# Patient Record
Sex: MALE | Race: WHITE | Employment: FULL TIME | URBAN - METROPOLITAN AREA
[De-identification: names, ages, dates, MRNs, and addresses within clinical notes are randomized per-mention and may not be internally consistent; named-entity substitution may affect disease eponyms.]

---

## 2022-03-18 PROBLEM — E66.01 SEVERE OBESITY (HCC): Status: ACTIVE | Noted: 2019-08-01

## 2022-03-19 PROBLEM — G47.10 HYPERSOMNIA: Status: ACTIVE | Noted: 2019-08-01

## 2022-03-19 PROBLEM — G47.26 SHIFT WORK SLEEP DISORDER: Status: ACTIVE | Noted: 2019-08-01

## 2022-09-09 NOTE — PROGRESS NOTES
[address] [place] [address] Court, [address] [facility]  [phone]    Patient Name:  Irene Danielle  YOB: 1975      Office Visit 9/12/2022    Chief Complaint   Patient presents with    Follow-up    Sleep Apnea    CPAP/BiPAP       HISTORY OF PRESENT ILLNESS:    This pleasant gentleman came in today for a follow-up visit after last being seen in 2020. Patient currently is on CPAP at 10 cm H2O with C-Flex 3 and has an old S9 machine. His original polysomnogram was in 2001 showed his AHI was 52.8 lowest sat was 76%    Patient's compliance data shows he is use it 90 out of 90 days. All 90 days or more than 4 hours. They are for an average of 7 minutes shy of 8 hours/day. He is currently using nasal pillows. He currently reports he has no problems with the airway pressure, his mask or humidity. Patient has been working on weight loss. He currently reports that when he for started Pap therapy in 2011 he was 230 pounds, but over the years he did go up and even this June he was up to 290 pounds. He indicated he has been running with a friend and he is currently down to 276 pounds. He reports he feels better. He still reports he can improve his diet slightly. 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 DIAGNOSTIC TESTS:    No flowsheet data found. Past Medical History:   Diagnosis Date    Asthma     HTN (hypertension)     Obesity, unspecified     HEBERT (obstructive sleep apnea)          Patient Active Problem List   Diagnosis    Obesity (BMI 30-39. 9)    HTN (hypertension)    Shift work sleep disorder    Hypersomnia    HEBERT (obstructive sleep apnea)    Sinus congestion           History reviewed. No pertinent surgical history.       Social History     Socioeconomic History    Marital status:      Spouse name: Not on file    Number of children: Not on file    Years of education: Not on file    Highest education level: Not on file Occupational History    Not on file   Tobacco Use    Smoking status: Never    Smokeless tobacco: Never   Substance and Sexual Activity    Alcohol use: No    Drug use: Never    Sexual activity: Not on file   Other Topics Concern    Not on file   Social History Narrative    Not on file     Social Determinants of Health     Financial Resource Strain: Not on file   Food Insecurity: Not on file   Transportation Needs: Not on file   Physical Activity: Not on file   Stress: Not on file   Social Connections: Not on file   Intimate Partner Violence: Not on file   Housing Stability: Not on file         Family History   Problem Relation Age of Onset    Heart Disease Other         Grandparents    Sleep Apnea Other         Aunt    Hypertension Other         Grandparents    Hypertension Mother          Allergies   Allergen Reactions    Penicillins Other (See Comments)         Current Outpatient Medications   Medication Sig    cetirizine (ZYRTEC) 10 MG tablet Take 10 mg by mouth daily    FARXIGA 5 MG tablet     fenofibrate (TRIGLIDE) 160 MG tablet     metFORMIN (GLUCOPHAGE-XR) 500 MG extended release tablet     FENOFIBRATE PO Take by mouth    fluticasone (FLONASE) 50 MCG/ACT nasal spray Nightly. lisinopril (PRINIVIL;ZESTRIL) 10 MG tablet Take by mouth daily     No current facility-administered medications for this visit. REVIEW OF SYSTEMS:     CONSTITUTIONAL:   There is Negative history of fever, chills, night sweats. Patient is  Positivefor weight loss, they are  Negative for  weight gain. Patient is  Negative  for fatigue and hypersomnia and is  on their CPAP. Insomnia is   under control. CARDIAC:   No chest pain, pressure, discomfort, palpitations, orthopnea, murmurs, or edema. GI:   No dysphagia, heartburn reflux, nausea/vomiting, diarrhea, abdominal pain, or bleeding.      NEURO:    There is no history of AMS, persistent headache, decreased level of consciousness, seizures, or motor or sensory deficits. PHYSICAL EXAM:    /78 (Site: Left Upper Arm, Position: Sitting)   Pulse 92   Temp 97 °F (36.1 °C) (Skin)   Resp 14   Ht 6' (1.829 m)   Wt 276 lb (125.2 kg)   SpO2 96%   BMI 37.43 kg/m²     Body mass index is 37.43 kg/m². Constitutional:  the patient is well developed and in no acute distress  EENMT:  Sclera clear, pupils equal, oral mucosa moist, narrow oral airway Modified [place] Stage 4, Nares are noted congested in the right nasal passage versus the left. There is decreased air entry in the right nasal passage. Respiratory: CTA b/l. No Wheezing or Rhonchi. Cardiovascular:  RRR without M,G,R  Gastrointestinal: soft and non-tender; with positive bowel sounds. Musculoskeletal: warm without cyanosis. There is No lower leg edema. Skin:  no jaundice or rashes, no visible wounds   Neurologic: no gross neuro deficits     Psychiatric:  alert and oriented x 3        ASSESSMENT/PLAN:  (Medical Decision Making)       ICD-10-CM    1. HEBERT (obstructive sleep apnea)  G47.33 -Patient has severe sleep apnea but well controlled with Pap therapy. We will write for him to get a new machine and S 11 and will use an AutoPap mode, since his weight has fluctuated over the past few years. He is in agreement with this plan. - He is compliant and benefiting from PAP therapy      2. Obesity (BMI 30-39. 9)  E66.9 -Patient has been losing weight by running at this time. Down 14 pounds since June of this year. Congratulated on his efforts. Also recommended diet control with decrease carbohydrate intake. Increase fruits and vegetables, lean meats, etc.      3. Sinus congestion  R09.81 -Does have sinus congestion and would likely benefit from nasal saline to his right nasal passage. If in the future that does not work, can use Flonase around dinnertime. Please note he does use nasal pillows      4.  Shift work sleep disorder  G47.26 -Currently at this time indicates he is no longer doing shift work.  He does have call every 6 weeks and may have to go in, but not as often as previously was      5. Hypersomnia  G47.10 -Improved with therapy             SUMMARY:    -Continue Pap therapy but now we will order him an S 11 with a auto mode with a pressure of 7-12 cm H2O with C-Flex of 3, humidification and an auto ramp time    Supplies Renewed. Patient is compliant and benefit benefiting from CPAP. Congratulated on weight loss advised to continue. See above regarding dietary recommendations     Continue proper sleep hygiene. Did review [place] score, and compliance data today. All questions are answered to the patient's satisfaction. The patient will call for further questions and concerns. Follow up at the [address] [place] [address] will be in 1 year. Follow up will be with the patient's referring physician as had been previously scheduled. Gabbi Estevez MD    Over 50% of today's office visit was spent in face to face time reviewing test results, prognosis, importance of compliance, education about disease process, benefits of medications, instructions for management of acute flare-ups, and follow up plans. Electronically signed and dictated. Please note if there are errors this is  likely a result of the dictation software. No orders of the defined types were placed in this encounter. No orders of the defined types were placed in this encounter.

## 2022-09-12 ENCOUNTER — OFFICE VISIT (OUTPATIENT)
Dept: SLEEP MEDICINE | Age: 47
End: 2022-09-12
Payer: COMMERCIAL

## 2022-09-12 VITALS
TEMPERATURE: 97 F | HEIGHT: 72 IN | HEART RATE: 92 BPM | RESPIRATION RATE: 14 BRPM | OXYGEN SATURATION: 96 % | DIASTOLIC BLOOD PRESSURE: 78 MMHG | BODY MASS INDEX: 37.38 KG/M2 | WEIGHT: 276 LBS | SYSTOLIC BLOOD PRESSURE: 112 MMHG

## 2022-09-12 DIAGNOSIS — E66.9 OBESITY (BMI 30-39.9): ICD-10-CM

## 2022-09-12 DIAGNOSIS — G47.33 OSA (OBSTRUCTIVE SLEEP APNEA): Primary | ICD-10-CM

## 2022-09-12 DIAGNOSIS — G47.10 HYPERSOMNIA: ICD-10-CM

## 2022-09-12 DIAGNOSIS — G47.26 SHIFT WORK SLEEP DISORDER: ICD-10-CM

## 2022-09-12 DIAGNOSIS — R09.81 SINUS CONGESTION: ICD-10-CM

## 2022-09-12 PROBLEM — E66.01 SEVERE OBESITY (HCC): Status: RESOLVED | Noted: 2019-08-01 | Resolved: 2022-09-12

## 2022-09-12 PROCEDURE — 99214 OFFICE O/P EST MOD 30 MIN: CPT | Performed by: INTERNAL MEDICINE

## 2022-09-12 RX ORDER — FENOFIBRATE 160 MG/1
TABLET ORAL
COMMUNITY
Start: 2022-09-08

## 2022-09-12 RX ORDER — DAPAGLIFLOZIN 5 MG/1
TABLET, FILM COATED ORAL
COMMUNITY
Start: 2022-09-08

## 2022-09-12 RX ORDER — METFORMIN HYDROCHLORIDE 500 MG/1
TABLET, EXTENDED RELEASE ORAL
COMMUNITY
Start: 2022-09-08

## 2022-09-12 RX ORDER — CETIRIZINE HYDROCHLORIDE 10 MG/1
10 TABLET ORAL DAILY
COMMUNITY

## 2022-09-12 ASSESSMENT — SLEEP AND FATIGUE QUESTIONNAIRES
HOW LIKELY ARE YOU TO NOD OFF OR FALL ASLEEP IN A CAR, WHILE STOPPED FOR A FEW MINUTES IN TRAFFIC: 0
HOW LIKELY ARE YOU TO NOD OFF OR FALL ASLEEP WHILE SITTING AND READING: 1
ESS TOTAL SCORE: 7
HOW LIKELY ARE YOU TO NOD OFF OR FALL ASLEEP WHILE SITTING AND TALKING TO SOMEONE: 0
HOW LIKELY ARE YOU TO NOD OFF OR FALL ASLEEP WHILE LYING DOWN TO REST IN THE AFTERNOON WHEN CIRCUMSTANCES PERMIT: 3
HOW LIKELY ARE YOU TO NOD OFF OR FALL ASLEEP WHILE SITTING INACTIVE IN A PUBLIC PLACE: 0
HOW LIKELY ARE YOU TO NOD OFF OR FALL ASLEEP WHILE WATCHING TV: 1
HOW LIKELY ARE YOU TO NOD OFF OR FALL ASLEEP WHEN YOU ARE A PASSENGER IN A CAR FOR AN HOUR WITHOUT A BREAK: 1
HOW LIKELY ARE YOU TO NOD OFF OR FALL ASLEEP WHILE SITTING QUIETLY AFTER LUNCH WITHOUT ALCOHOL: 1

## 2023-09-11 NOTE — PROGRESS NOTES
)-Non-Disposable Filter (1 per 6 mos)  (  x   )-Water Chamber (1 per 6 mos)  (     )-Humidifier non-heated (1 per 5 yrs)      Signed Date: 9/13/2023  Electronically Signed By: MUNIR Coleman CNP  Electronically Dated:  9/13/2023       Collaborating Physician: Dr. Marina Gutierrez office visit was spent  reviewing test results, prognosis, importance of compliance, education about disease process, benefits of medications, instructions for management of acute flare-ups, and follow up plans. Total time spent with patient was 20 minutes.         MUNIR Coleman CNP  Electronically signed

## 2023-09-13 ENCOUNTER — OFFICE VISIT (OUTPATIENT)
Dept: SLEEP MEDICINE | Age: 48
End: 2023-09-13
Payer: COMMERCIAL

## 2023-09-13 VITALS
SYSTOLIC BLOOD PRESSURE: 122 MMHG | OXYGEN SATURATION: 95 % | DIASTOLIC BLOOD PRESSURE: 80 MMHG | WEIGHT: 290 LBS | HEIGHT: 72 IN | HEART RATE: 86 BPM | RESPIRATION RATE: 14 BRPM | BODY MASS INDEX: 39.28 KG/M2

## 2023-09-13 DIAGNOSIS — G47.33 OSA (OBSTRUCTIVE SLEEP APNEA): Primary | ICD-10-CM

## 2023-09-13 DIAGNOSIS — E66.9 OBESITY (BMI 30-39.9): ICD-10-CM

## 2023-09-13 PROCEDURE — 99213 OFFICE O/P EST LOW 20 MIN: CPT | Performed by: STUDENT IN AN ORGANIZED HEALTH CARE EDUCATION/TRAINING PROGRAM

## 2023-09-13 PROCEDURE — 3079F DIAST BP 80-89 MM HG: CPT | Performed by: STUDENT IN AN ORGANIZED HEALTH CARE EDUCATION/TRAINING PROGRAM

## 2023-09-13 PROCEDURE — 3074F SYST BP LT 130 MM HG: CPT | Performed by: STUDENT IN AN ORGANIZED HEALTH CARE EDUCATION/TRAINING PROGRAM

## 2023-09-13 RX ORDER — ALLOPURINOL 300 MG/1
300 TABLET ORAL DAILY
COMMUNITY
Start: 2023-08-17

## 2023-09-13 RX ORDER — AMILORIDE HYDROCHLORIDE AND HYDROCHLOROTHIAZIDE 5; 50 MG/1; MG/1
1 TABLET ORAL DAILY
COMMUNITY
Start: 2023-08-17

## 2023-09-13 ASSESSMENT — SLEEP AND FATIGUE QUESTIONNAIRES
HOW LIKELY ARE YOU TO NOD OFF OR FALL ASLEEP WHILE SITTING AND TALKING TO SOMEONE: 1
HOW LIKELY ARE YOU TO NOD OFF OR FALL ASLEEP WHILE LYING DOWN TO REST IN THE AFTERNOON WHEN CIRCUMSTANCES PERMIT: 2
HOW LIKELY ARE YOU TO NOD OFF OR FALL ASLEEP WHILE SITTING AND READING: 2
HOW LIKELY ARE YOU TO NOD OFF OR FALL ASLEEP WHILE SITTING INACTIVE IN A PUBLIC PLACE: 2
ESS TOTAL SCORE: 12
HOW LIKELY ARE YOU TO NOD OFF OR FALL ASLEEP WHEN YOU ARE A PASSENGER IN A CAR FOR AN HOUR WITHOUT A BREAK: 1
HOW LIKELY ARE YOU TO NOD OFF OR FALL ASLEEP WHILE SITTING QUIETLY AFTER LUNCH WITHOUT ALCOHOL: 2
HOW LIKELY ARE YOU TO NOD OFF OR FALL ASLEEP IN A CAR, WHILE STOPPED FOR A FEW MINUTES IN TRAFFIC: 0
HOW LIKELY ARE YOU TO NOD OFF OR FALL ASLEEP WHILE WATCHING TV: 2

## 2024-10-01 NOTE — PROGRESS NOTES
patient's referring physician as had been previously scheduled.    Jeremy Delatorre MD    Over 50% of today's office visit was spent in face to face time reviewing test results, prognosis, importance of compliance, education about disease process, benefits of medications, instructions for management of acute flare-ups, and follow up plans.       Electronically signed and dictated.  Please note if there are errors this is  likely a result of the dictation software.    No orders of the defined types were placed in this encounter.     No orders of the defined types were placed in this encounter.

## 2024-10-02 ENCOUNTER — OFFICE VISIT (OUTPATIENT)
Dept: SLEEP MEDICINE | Age: 49
End: 2024-10-02
Payer: COMMERCIAL

## 2024-10-02 VITALS
WEIGHT: 290 LBS | BODY MASS INDEX: 39.28 KG/M2 | HEART RATE: 64 BPM | OXYGEN SATURATION: 97 % | RESPIRATION RATE: 14 BRPM | HEIGHT: 72 IN | DIASTOLIC BLOOD PRESSURE: 74 MMHG | SYSTOLIC BLOOD PRESSURE: 112 MMHG

## 2024-10-02 DIAGNOSIS — G47.26 SHIFT WORK SLEEP DISORDER: ICD-10-CM

## 2024-10-02 DIAGNOSIS — G47.33 OSA (OBSTRUCTIVE SLEEP APNEA): Primary | ICD-10-CM

## 2024-10-02 DIAGNOSIS — E66.9 OBESITY (BMI 30-39.9): ICD-10-CM

## 2024-10-02 PROCEDURE — 99214 OFFICE O/P EST MOD 30 MIN: CPT | Performed by: INTERNAL MEDICINE

## 2024-10-02 PROCEDURE — G2211 COMPLEX E/M VISIT ADD ON: HCPCS | Performed by: INTERNAL MEDICINE

## 2024-10-02 PROCEDURE — 3074F SYST BP LT 130 MM HG: CPT | Performed by: INTERNAL MEDICINE

## 2024-10-02 PROCEDURE — 3078F DIAST BP <80 MM HG: CPT | Performed by: INTERNAL MEDICINE

## 2024-10-02 ASSESSMENT — SLEEP AND FATIGUE QUESTIONNAIRES
HOW LIKELY ARE YOU TO NOD OFF OR FALL ASLEEP WHILE LYING DOWN TO REST IN THE AFTERNOON WHEN CIRCUMSTANCES PERMIT: HIGH CHANCE OF DOZING
HOW LIKELY ARE YOU TO NOD OFF OR FALL ASLEEP IN A CAR, WHILE STOPPED FOR A FEW MINUTES IN TRAFFIC: WOULD NEVER DOZE
ESS TOTAL SCORE: 6
HOW LIKELY ARE YOU TO NOD OFF OR FALL ASLEEP WHEN YOU ARE A PASSENGER IN A CAR FOR AN HOUR WITHOUT A BREAK: SLIGHT CHANCE OF DOZING
HOW LIKELY ARE YOU TO NOD OFF OR FALL ASLEEP WHILE SITTING AND READING: WOULD NEVER DOZE
HOW LIKELY ARE YOU TO NOD OFF OR FALL ASLEEP WHILE SITTING AND TALKING TO SOMEONE: SLIGHT CHANCE OF DOZING
HOW LIKELY ARE YOU TO NOD OFF OR FALL ASLEEP WHILE SITTING QUIETLY AFTER LUNCH WITHOUT ALCOHOL: WOULD NEVER DOZE
HOW LIKELY ARE YOU TO NOD OFF OR FALL ASLEEP WHILE SITTING INACTIVE IN A PUBLIC PLACE: WOULD NEVER DOZE
HOW LIKELY ARE YOU TO NOD OFF OR FALL ASLEEP WHILE WATCHING TV: SLIGHT CHANCE OF DOZING

## 2025-04-22 NOTE — PROGRESS NOTES
[facility]  [address]  [place] [address]  [phone]    Patient Name:  Bal Phillips  YOB: 1975      Office Visit 4/23/2025    Chief Complaint   Patient presents with    Follow-up    Sleep Apnea    CPAP/BiPAP       HISTORY OF PRESENT ILLNESS:    This pleasant gentleman came in today for a follow-up visit regarding his sleep apnea.  .  To recap:  Patient has a diagnosis of sleep apnea patient's sleep study in 2001 revealed AHI of 52.8 with lowest desaturation 76%. He is prescribed cpap therapy with a humidifier set at 7-12cm with C-Flex of -2 with a nasal mask.  He has been compliant in the past.    Patient has been trying to work on weight loss.    At this time:  Patient is currently doing very well with PAP therapy.  Compliance data shows he is using 90 out of 90 days and 89 days of more than 4 hours average sleep is 7 hours and 28 minutes.  His 95th percentile pressure is 9.2 cm H2O.  His 95th percentile air leak is 1.1 L/min.  And his AHI is down to 0.4.  He indicates he feels good with his PAP therapy he does like the new mask and feels it is working for him.-The Neo 2.0 nasal pillow mask.    Denies any problems with the airway pressure, his mask or humidity.    He currently is on a new medication which he reports has helped him curb his appetite it is Rybelsus.  Please note he has lost 11 pounds since October of this year.  He feels better.    [place] score is 6/24         4/23/2025    10:09 AM 10/2/2024     3:05 PM 9/13/2023     3:01 PM 9/12/2022     8:27 AM   Sleep Medicine   Sitting and reading 0 0 2 1   Watching TV 1 1 2 1   Sitting, inactive in a public place (e.g. a theatre or a meeting) 0 0 2 0   As a passenger in a car for an hour without a break 2 1 1 1   Lying down to rest in the afternoon when circumstances permit 3 3 2 3   Sitting and talking to someone 0 1 1 0   Sitting quietly after a lunch without alcohol 0 0 2 1   In a car, while stopped for

## 2025-04-23 ENCOUNTER — OFFICE VISIT (OUTPATIENT)
Dept: SLEEP MEDICINE | Age: 50
End: 2025-04-23
Payer: COMMERCIAL

## 2025-04-23 VITALS
HEIGHT: 72 IN | SYSTOLIC BLOOD PRESSURE: 144 MMHG | WEIGHT: 279 LBS | BODY MASS INDEX: 37.79 KG/M2 | RESPIRATION RATE: 14 BRPM | DIASTOLIC BLOOD PRESSURE: 96 MMHG | HEART RATE: 89 BPM | OXYGEN SATURATION: 90 %

## 2025-04-23 DIAGNOSIS — E66.9 OBESITY (BMI 30-39.9): ICD-10-CM

## 2025-04-23 DIAGNOSIS — G47.33 OSA (OBSTRUCTIVE SLEEP APNEA): Primary | ICD-10-CM

## 2025-04-23 PROCEDURE — 3077F SYST BP >= 140 MM HG: CPT | Performed by: INTERNAL MEDICINE

## 2025-04-23 PROCEDURE — G2211 COMPLEX E/M VISIT ADD ON: HCPCS | Performed by: INTERNAL MEDICINE

## 2025-04-23 PROCEDURE — 99214 OFFICE O/P EST MOD 30 MIN: CPT | Performed by: INTERNAL MEDICINE

## 2025-04-23 PROCEDURE — 3080F DIAST BP >= 90 MM HG: CPT | Performed by: INTERNAL MEDICINE

## 2025-04-23 RX ORDER — ORAL SEMAGLUTIDE 7 MG/1
TABLET ORAL
COMMUNITY

## 2025-04-23 ASSESSMENT — SLEEP AND FATIGUE QUESTIONNAIRES
HOW LIKELY ARE YOU TO NOD OFF OR FALL ASLEEP WHILE SITTING AND READING: WOULD NEVER DOZE
ESS TOTAL SCORE: 6
HOW LIKELY ARE YOU TO NOD OFF OR FALL ASLEEP WHILE SITTING AND TALKING TO SOMEONE: WOULD NEVER DOZE
HOW LIKELY ARE YOU TO NOD OFF OR FALL ASLEEP IN A CAR, WHILE STOPPED FOR A FEW MINUTES IN TRAFFIC: WOULD NEVER DOZE
HOW LIKELY ARE YOU TO NOD OFF OR FALL ASLEEP WHILE SITTING INACTIVE IN A PUBLIC PLACE: WOULD NEVER DOZE
HOW LIKELY ARE YOU TO NOD OFF OR FALL ASLEEP WHEN YOU ARE A PASSENGER IN A CAR FOR AN HOUR WITHOUT A BREAK: MODERATE CHANCE OF DOZING
HOW LIKELY ARE YOU TO NOD OFF OR FALL ASLEEP WHILE LYING DOWN TO REST IN THE AFTERNOON WHEN CIRCUMSTANCES PERMIT: HIGH CHANCE OF DOZING
HOW LIKELY ARE YOU TO NOD OFF OR FALL ASLEEP WHILE SITTING QUIETLY AFTER LUNCH WITHOUT ALCOHOL: WOULD NEVER DOZE
HOW LIKELY ARE YOU TO NOD OFF OR FALL ASLEEP WHILE WATCHING TV: SLIGHT CHANCE OF DOZING